# Patient Record
(demographics unavailable — no encounter records)

---

## 2024-10-07 NOTE — BEGINNING OF VISIT
How Severe Are Your Spot(S)?: mild
[Mother] : mother
[Mother] : mother
What Type Of Note Output Would You Prefer (Optional)?: Bullet Format
What Is The Reason For Today's Visit?: Full Body Skin Examination with No Concerns
What Is The Reason For Today's Visit? (Being Monitored For X): concerning skin lesions on an annual basis

## 2024-10-08 NOTE — HISTORY OF PRESENT ILLNESS
[de-identified] : Recheck pneumonia. Per mom pt finished Azithromycin x8 days ago. Cough improved, no difficulty breathing. No n/v/c/d, eating/drinking well- normal voiding, afebrile.  [FreeTextEntry6] : THEA is here today for follow up clinical  pneumonia feeling better Reviewed last office visit 9/27 given Zithromax history rapid Covid 19 and rapid flu test negative 9/22/24 doing well active normal appetite cough improved, no fever had chills

## 2024-10-08 NOTE — HISTORY OF PRESENT ILLNESS
[de-identified] : Recheck pneumonia. Per mom pt finished Azithromycin x8 days ago. Cough improved, no difficulty breathing. No n/v/c/d, eating/drinking well- normal voiding, afebrile.  [FreeTextEntry6] : THEA is here today for follow up clinical  pneumonia feeling better Reviewed last office visit 9/27 given Zithromax history rapid Covid 19 and rapid flu test negative 9/22/24 doing well active normal appetite cough improved, no fever had chills

## 2024-10-08 NOTE — PHYSICAL EXAM
[TextEntry] : Gen: Awake, alert,  In no acute distress , well appearing activ Eyes: no periorbital swelling Ears : right  External Auditory Canal:  Normal. Tympanic Membrane:  Normal             left External Auditory Canal:  Normal   Tympanic Membrane:  Normal Pharynx: no redness ,no sores, not inflamed  Neck supple Lymph: anterior and submandibular glands no lymphadenopathy Cardiac : normal rate, regular rhythm, S1,S2 normal, no murmur Lungs: clear to auscultation, no crackles no wheeze, no grunting flaring or retractions

## 2024-10-28 NOTE — HISTORY OF PRESENT ILLNESS
[de-identified] : Having left ear pain on and off, pain got worst this morning, no fever, no cough, no congestion.  [FreeTextEntry6] : no vomiting, no diarrhea normal appetite

## 2024-10-28 NOTE — PHYSICAL EXAM
[TextEntry] : General:  no acute distress, alert   Ears:  TMs erythematous with purulent effusion bilaterally Nose:  pink nasal mucosa  Mouth:  nonerythematous oropharynx  Neck:  supple   Lungs:  clear to auscultation bilaterally  Cardiac:  regular rate and rhythm  Abdomen:  soft, non tender, non distended  Lymphatics:  no abnormal lymph nodes palpated Skin:  warm

## 2024-11-07 NOTE — CONSULT LETTER
[Today's Date] : [unfilled] [Name] : Name: [unfilled] [] : : ~~ [Today's Date:] : [unfilled] [Dear  ___:] : Dear Dr. [unfilled]: [Consult] : I had the pleasure of evaluating your patient, [unfilled]. My full evaluation follows. [Consult - Single Provider] : Thank you very much for allowing me to participate in the care of this patient. If you have any questions, please do not hesitate to contact me. [Sincerely,] : Sincerely, [FreeTextEntry4] : Carolyn Alvarado MD [FreeTextEntry5] : [id number] [address] [FreeTextEntry6] : [place], [place] [address] [de-identified] : Amanuel Bey MD, FACC, FASE, FAAP\par  Pediatric Cardiologist\par  [facility]\par

## 2024-11-07 NOTE — CARDIOLOGY SUMMARY
[Today's Date] : [unfilled] [LVSF ___%] : LV Shortening Fraction [unfilled]% [FreeTextEntry1] : Normal sinus rhythm, normal QRS axis, normal intervals (QTc 390 msec), left ventricular hypertrophy, no pre-excitation, no ST segment or T wave abnormalities. Abnormal EKG. [FreeTextEntry2] : Mild buckling of the mitral valve leaflets. LV dimensions and shortening fraction were normal.  No pericardial effusion.

## 2024-11-07 NOTE — PHYSICAL EXAM
[General Appearance - Alert] : alert [General Appearance - In No Acute Distress] : in no acute distress [General Appearance - Well Nourished] : well nourished [General Appearance - Well Developed] : well developed [General Appearance - Well-Appearing] : well appearing [Appearance Of Head] : the head was normocephalic [Facies] : there were no dysmorphic facial features [Sclera] : the conjunctiva were normal [Outer Ear] : the ears and nose were normal in appearance [Examination Of The Oral Cavity] : mucous membranes were moist and pink [Auscultation Breath Sounds / Voice Sounds] : breath sounds clear to auscultation bilaterally [Normal Chest Appearance] : the chest was normal in appearance [Chest Palpation Tender Sternum] : no chest wall tenderness [Apical Impulse] : quiet precordium with normal apical impulse [Heart Rate And Rhythm] : normal heart rate and rhythm [Heart Sounds] : normal S1 and S2 [Heart Sounds Gallop] : no gallops [Heart Sounds Pericardial Friction Rub] : no pericardial rub [Heart Sounds Click] : no clicks [Arterial Pulses] : normal upper and lower extremity pulses with no pulse delay [Edema] : no edema [Capillary Refill Test] : normal capillary refill [Systolic] : systolic [II] : a grade 2/6 [LMSB] : LMSB  [Low] : low pitched [Vibratory] : vibratory [Mid] : mid [Bowel Sounds] : normal bowel sounds [Abdomen Soft] : soft [Nondistended] : nondistended [Abdomen Tenderness] : non-tender [Musculoskeletal Exam: Normal Movement Of All Extremities] : normal movements of all extremities [Musculoskeletal - Swelling] : no joint swelling seen [Musculoskeletal - Tenderness] : no joint tenderness was elicited [Nail Clubbing] : no clubbing  or cyanosis of the fingers [Cervical Lymph Nodes Enlarged Anterior] : The anterior cervical nodes were normal [Motor Tone] : normal muscle strength and tone [Cervical Lymph Nodes Enlarged Posterior] : The posterior cervical nodes were normal [] : no rash [Skin Lesions] : no lesions [Skin Turgor] : normal turgor [Demonstrated Behavior - Infant Nonreactive To Parents] : interactive [Mood] : mood and affect were appropriate for age [Demonstrated Behavior] : normal behavior

## 2025-07-22 NOTE — REVIEW OF SYSTEMS
[Ear Pain] : ear pain [Fever] : no fever [Nasal Discharge] : no nasal discharge [Sore Throat] : no sore throat [Cough] : no cough [Vomiting] : no vomiting [Diarrhea] : no diarrhea

## 2025-07-22 NOTE — HISTORY OF PRESENT ILLNESS
[de-identified] : Pt c/o right ear pain as of yesterday. Denies any other symptoms. [FreeTextEntry6] :  no congestion or cough, no ST, + right ear pain X1day, no n/v/c/d, eating and drinking well, normal voiding. afebrile. + swimming

## 2025-07-22 NOTE — DISCUSSION/SUMMARY
[FreeTextEntry1] : D/W caregiver otitis externa, antibiotic ear drops as below, reviewed supportive care including antipyretics, nasal saline and fluid intake; reviewed monitor for persistent fever, worsening ear pain, dehydration and call if occurring for recheck.  time spent: 25min

## 2025-07-22 NOTE — PHYSICAL EXAM
[NL] : warm, clear [Clear] : right tympanic membrane clear [FreeTextEntry3] : + b/l curdy white d/c lining ear canals

## 2025-07-25 NOTE — HISTORY OF PRESENT ILLNESS
[Mother] : mother [Yes] : Patient goes to dentist yearly [Toothpaste] : Primary Fluoride Source: Toothpaste [No] : Patient has not had sexual intercourse [NO] : No [Uses electronic nicotine delivery system] : does not use electronic nicotine delivery system [Exposure to electronic nicotine delivery system] : no exposure to electronic nicotine delivery system [Uses tobacco] : does not use tobacco [Exposure to tobacco] : no exposure to tobacco [Uses drugs] : does not use drugs  [Drinks alcohol] : does not drink alcohol [FreeTextEntry7] : 13 yr [facility] [FreeTextEntry1] :  No reactions to previous vaccinations.  No history of injury  and  patient is doing well - has no concerns or issues.   Denies depression or psychiatric issues.  Appetite good - eats a variety of foods.  Menses: No menarche yet   Sleeping well/good sleeping patterns  and  no problems in school identified -  no ADD/ADHD concerns.  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  Patient not having any fevers without a cause, pain that wakes them in the night, or night sweats.  Urinating and stooling normally, no chest pain, palpitations or syncope with exercise.  Parent(s) have no current concerns or issues. followed with cardiology for routine and everything is good f/u in 5 years per cardiology

## 2025-07-25 NOTE — PHYSICAL EXAM
[TextEntry] : General: awake, alert, no acute distress, interactive, cooperative, appropriate for age Head: normocephalic, no signs injury Eyes: + red reflex bilaterally, EOMI, no purulent discharge, no conjunctival or scleral erythema Ears: tympanic membranes normal appearing bilaterally, no ear pit or tag Nose: no discharge Mouth: mucosa moist and pink, oropharynx without erythema Neck: supple, FROM Lungs: clear to auscultation bilaterally, no accessory muscle use Cardiac: normal S1 S2, regular rate and rhythm, II/VI systolic murmur appreciated Abdomen: soft, non tender, non distended, no hepatosplenomegaly  Chest: [place] 2 breast development Genitals: [place] 3 normal appearing female genitalia Lymphatics: no abnormal lymph nodes palpated Back: no scoliosis noted Skin: no rash, no lesions

## 2025-07-25 NOTE — PLAN
[TextEntry] : Continue balanced diet with all food groups. Brush teeth twice a day with toothbrush. Recommend visit to dentist. Help child to maintain consistent daily routines and sleep schedule. Personal hygiene and puberty discussed. School discussed. Ensure home is safe. Teach child about personal safety. Use consistent, positive discipline. Limit screen time to no more than 2 hours per day. Encourage physical activity. Return 1 year for routine well child check. care coordination reviewed, 5-2-1-0 reviewed, cardiac screen negative Patient may participate in all activities without restriction.   finish ear drops for swimmers ear f/u cardiology as discussed

## 2025-07-25 NOTE — HISTORY OF PRESENT ILLNESS
[Mother] : mother [Yes] : Patient goes to dentist yearly [Toothpaste] : Primary Fluoride Source: Toothpaste [No] : Patient has not had sexual intercourse [NO] : No [Uses electronic nicotine delivery system] : does not use electronic nicotine delivery system [Exposure to electronic nicotine delivery system] : no exposure to electronic nicotine delivery system [Uses tobacco] : does not use tobacco [Exposure to tobacco] : no exposure to tobacco [Uses drugs] : does not use drugs  [Drinks alcohol] : does not drink alcohol [FreeTextEntry7] : 13 yr [facility] [FreeTextEntry1] :  No reactions to previous vaccinations.  No history of injury  and  patient is doing well - has no concerns or issues.   Denies depression or psychiatric issues.  Appetite good - eats a variety of foods.  Menses: No menarche yet   Sleeping well/good sleeping patterns  and  no problems in school identified -  no ADD/ADHD concerns.  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  Patient not having any fevers without a cause, pain that wakes them in the night, or night sweats.  Urinating and stooling normally, no chest pain, palpitations or syncope with exercise.  Parent(s) have no current concerns or issues. followed with cardiology for routine and everything is good f/u in 5 years per cardiology